# Patient Record
(demographics unavailable — no encounter records)

---

## 2024-10-29 NOTE — PLAN
[TextEntry] : Refer back to Dr. Oakley. The patient was informed that serial f/u with pelvic exam and serial TVS q 6-12 months.

## 2024-10-29 NOTE — HISTORY OF PRESENT ILLNESS
[FreeTextEntry1] : 54 year old, para 1-0-0-1, C/S x1, LMP 2020, patient of Dr. Oakley.  Patient had a recent Pelvic US on 09/30/24 where uterine fibroids were noted to be increasing in size. 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  Pelvic US (09/30/24) FINDINGS: Real-time transabdominal as well as endovaginal examination discloses a retroverted uterus, 6.3 x 2.9 x 3.5 cm. There is a 2 mm midline endometrial echo, measured endovaginally. No focal endometrial lesion or fluid. Leiomyomata are as follows: Leiomyoma 1, right fundus subserosal, 5.0 x 3.0 x 4.2 cm, previously 3.9 x 2.3 x 4.2 cm. Leiomyoma 2, right uterine body, and tail, 1.5 x 1.0 x 1.6 cm, previously 1.8 x 1.3 x 1.6 cm. Leiomyoma 3, Posterior uterine body, intramural, 1.2 x 0.6 x 1.0 cm, previously 1.4 x 0.7 x 1.2 cm. No additional myometrial lesions. There is no free fluid in the cul de sac.  Patient presents today for initial evaluation, referred by Dr. Oakley.

## 2024-10-29 NOTE — OB HISTORY
[Total Preg ___] : : [unfilled] [Living ___] : [unfilled] (living) [Vaginal ___] : [unfilled] vaginal delivery(s) [Menarche Age ____] : age at menarche was [unfilled] [Menopause  Age ____] : menopause occurred at age [unfilled] [Full Term ___] : [unfilled] (full-term) [ ___] : [unfilled]  section delivery(s)

## 2024-10-29 NOTE — PHYSICAL EXAM
[Chaperone Present] : A chaperone was present in the examining room during all aspects of the physical examination [37651] : A chaperone was present during the pelvic exam. [FreeTextEntry2] : Ciarra Barrett [TextEntry] : Well - developed, well-nourished female in no acute distress HEENT - wnl Breasts - symmetrical w/o masses or nipple discharge Abdomen - soft, non-tender, +BS Back - no CVA tenderness or spinal tenderness Extremities - w/o clubbing, cyanosis, no lesions noted Neuro - AAOx3  Pelvic Exam External Genitalia - with out lesions Vagina - mucosa atrophic, no lesions noted Cervix - no lesions Uterus - 10 wk size, nontender, mobile Adnexa - no palpable masses or tenderness b/l Rectovaginal - confirmatory

## 2024-10-29 NOTE — PHYSICAL EXAM
[Chaperone Present] : A chaperone was present in the examining room during all aspects of the physical examination [[id number]] : A chaperone was present during the pelvic exam. [FreeTextEntry2] : Ciarra Barrett [TextEntry] : Well - developed, well-nourished female in no acute distress HEENT - wnl Breasts - symmetrical w/o masses or nipple discharge Abdomen - soft, non-tender, +BS Back - no CVA tenderness or spinal tenderness Extremities - w/o clubbing, cyanosis, no lesions noted Neuro - AAOx3  Pelvic Exam External Genitalia - with out lesions Vagina - mucosa atrophic, no lesions noted Cervix - no lesions Uterus - 10 wk size, nontender, mobile Adnexa - no palpable masses or tenderness b/l Rectovaginal - confirmatory

## 2024-10-29 NOTE — ASSESSMENT
[FreeTextEntry1] : 54 year old, para 1-0-0-1, LMP 2020, patient of Dr. Oakley. Patient had a recent Pelvic US on 09/30/24 where uterine fibroids were noted to be increasing in size. 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 The patient is asymptomatic and is interested in conservative management.

## 2025-01-19 NOTE — PHYSICAL EXAM
[de-identified] : Healthy [de-identified] : No adenopathy [de-identified] : No inguinal or femoral adenopathy bilaterally.  The small incision in the right groin has healed without problems. [de-identified] : Vertical incision on the anterior aspect of the right thigh is somewhat retracted, but less so than previous and there is no evidence of local recurrence or satellite lesions.

## 2025-01-19 NOTE — ASSESSMENT
[FreeTextEntry1] : Benign surveillance examination.  The patient will return in 4 months for reexam, or sooner as needed.  She will continue her regular surveillance visits with the dermatologist also.  All her questions were answered.

## 2025-01-19 NOTE — HISTORY OF PRESENT ILLNESS
[de-identified] : The patient returns for her scheduled surveillance examination, and she notes no new skin lesions or other problems.  She underwent a wide local excision and sentinel node biopsy for a T2 N0 squamous cell carcinoma of the right anterior thigh, in July of last year.  She last saw her dermatologist approximately 5 months ago and sees him every 6 months.

## 2025-03-06 NOTE — REVIEW OF SYSTEMS
[Fever] : no fever [Chills] : no chills [Blurry Vision] : no blurred vision [Hearing Loss] : no hearing loss [SOB] : no shortness of breath [Dyspnea on exertion] : not dyspnea during exertion [Cough] : no cough [Abdominal Pain] : no abdominal pain [Dysuria] : no dysuria [Joint Pain] : no joint pain [Dizziness] : no dizziness [Confusion] : no confusion was observed [Easy Bleeding] : no tendency for easy bleeding [de-identified] : Hx squamous cancer

## 2025-03-06 NOTE — DISCUSSION/SUMMARY
[FreeTextEntry1] : 54 yo non smoker. She awoke one week ago chest pain. Better sit up. Not exertional Treadmill 4 per week 30 minutes.. EKG reviewed. Chest pain appears GI. Would do stress and echo . She is post Menopause. Cousin Cezar.

## 2025-03-06 NOTE — HISTORY OF PRESENT ILLNESS
[FreeTextEntry1] : 54 yo non smoker. She awoke one week ago chest pain. Better sit up. Not exertional Treadmill 4 per week 30 minutes.